# Patient Record
Sex: MALE | Race: BLACK OR AFRICAN AMERICAN | NOT HISPANIC OR LATINO | Employment: OTHER | URBAN - NONMETROPOLITAN AREA
[De-identification: names, ages, dates, MRNs, and addresses within clinical notes are randomized per-mention and may not be internally consistent; named-entity substitution may affect disease eponyms.]

---

## 2024-03-15 PROBLEM — E78.5 HYPERLIPIDEMIA: Status: ACTIVE | Noted: 2024-03-15

## 2024-03-15 PROBLEM — I65.29 CAROTID ATHEROSCLEROSIS: Status: ACTIVE | Noted: 2024-03-15

## 2024-03-15 PROBLEM — I25.10 ARTERIOSCLEROTIC CORONARY ARTERY DISEASE: Status: ACTIVE | Noted: 2024-03-15

## 2024-03-15 PROBLEM — G20.A1 PARKINSON'S DISEASE (MULTI): Status: ACTIVE | Noted: 2024-03-15

## 2024-03-15 PROBLEM — R94.31 ABNORMAL EKG: Status: ACTIVE | Noted: 2024-03-15

## 2024-03-15 PROBLEM — Z98.61 HISTORY OF PTCA: Status: ACTIVE | Noted: 2024-03-15

## 2024-03-15 PROBLEM — I10 ESSENTIAL HYPERTENSION: Status: ACTIVE | Noted: 2024-03-15

## 2024-03-15 RX ORDER — AMLODIPINE AND BENAZEPRIL HYDROCHLORIDE 10; 20 MG/1; MG/1
1 CAPSULE ORAL DAILY
COMMUNITY
Start: 2022-07-10

## 2024-03-15 RX ORDER — BISMUTH SUBSALICYLATE 262 MG
1 TABLET,CHEWABLE ORAL DAILY
COMMUNITY

## 2024-03-15 RX ORDER — GLUCOSAM/CHON-MSM1/C/MANG/BOSW 750-644 MG
2 TABLET ORAL DAILY
COMMUNITY

## 2024-03-15 RX ORDER — ASPIRIN 81 MG/1
1 TABLET ORAL DAILY
COMMUNITY
Start: 2022-08-12

## 2024-03-15 RX ORDER — ATORVASTATIN CALCIUM 80 MG/1
1 TABLET, FILM COATED ORAL NIGHTLY
COMMUNITY
Start: 2021-08-11

## 2024-03-15 RX ORDER — OMEPRAZOLE 20 MG/1
20 CAPSULE, DELAYED RELEASE ORAL
COMMUNITY
Start: 2022-07-23

## 2024-03-15 RX ORDER — B1/B2/B3/B5/B6/IRON/METH/CHOLN 2.5-18/15
LIQUID (ML) ORAL DAILY
COMMUNITY

## 2024-03-15 RX ORDER — CARVEDILOL 6.25 MG/1
6.25 TABLET ORAL
COMMUNITY

## 2024-03-15 RX ORDER — TAMSULOSIN HYDROCHLORIDE 0.4 MG/1
1 CAPSULE ORAL DAILY
COMMUNITY
Start: 2022-05-30

## 2024-08-16 ENCOUNTER — TELEPHONE (OUTPATIENT)
Dept: CARDIOLOGY | Facility: CLINIC | Age: 84
End: 2024-08-16
Payer: MEDICARE

## 2024-08-19 ENCOUNTER — APPOINTMENT (OUTPATIENT)
Dept: CARDIOLOGY | Facility: CLINIC | Age: 84
End: 2024-08-19
Payer: MEDICARE

## 2024-09-08 NOTE — PROGRESS NOTES
"Patient is new to this provider.  Last seen by Dr. Jeffery in August 2023.    Subjective :     Pleasant 84-year-old.  Independent in activities of daily living ambulates with the help of cane.  No falls interval review of systems is negative for chest discomfort pressure tightness heaviness palpitations lightheadedness orthopnea paroxysmal nocturnal dyspnea dependent edema or claudication TIA or CVA type symptoms or bleeding diathesis      Cardiovascular  history:  1.  Essential hypertension  2.  Mixed hyperlipidemia  3.  Former smoker, quit more than 20 years ago  4.  Atherosclerotic native vessel coronary artery disease remote PCI in [place].  5.  History of carotid endarterectomy  6.  Primary hypertension  7.  Carotid ultrasound August 2021-50 to 70% stenosis right internal carotid artery less than 50% stenosis left internal carotid artery antegrade vertebral flow  As of 9/9/2024 visit, I do not see any objective data pertaining to prior testing  Medical history so Far :( as outlined in Dr. Wilde's notes)    Calculus of gallbladder with acute on chronic cholecystitis without obstruction   Carotid artery stenosis   Cholangiectasis   Constipation   Coronary artery disease (CMS/HCC)   Gallstones   Gastroesophageal reflux disease   Hyperlipidemia (CMS/HCC)   Hypertension (CMS/HCC)   Osteoarthritis   Peptic ulcer disease   Right upper quadrant pain   Urinary hesitancy       Past Medical History:     Arthritis   GI bleed 2023   hospitalized   Hypertension (CMS/HCC)   Visual impairment   with correct lenses   Objective   Wt Readings from Last 3 Encounters:   09/09/24 91.2 kg (201 lb)   08/09/23 91.2 kg (201 lb)   08/10/22 95.3 kg (210 lb)            Vitals:    09/09/24 1019 09/09/24 1055   BP: 154/70 160/64   BP Location: Left arm Left arm   Patient Position: Sitting Sitting   Pulse: 62    Weight: 91.2 kg (201 lb)    Height: 1.778 m (5' 10\")                 Physical Exam:    GENERAL APPEARANCE: in no acute " distress.  CHEST: Symmetric and non-tender.  INTEGUMENT: Skin warm and dry  HEENT: No gross abnormalities identified.No pallor or scleral icterus.  NECK: Supple, no JVD, no bruit.   NEURO/PSHCY: Alert and oriented x3; appropriate behavior and responses and responses  LUNGS: Clear to auscultation bilaterally; normal respiratory effort.  HEART: Rate and rhythm regular with no evident murmur; no gallop appreciated.   ABDOMEN: Soft, non tender.  MUSCULOSKELETAL: No gross deformities.  EXTREMITIES: Warm  There is 1-2+ pitting edema    Meds:  Current Outpatient Medications   Medication Instructions    acetaminophen (TYLENOL 8 HOUR) 650 mg, oral, Every 8 hours PRN, Do not crush, chew, or split.    amLODIPine-benazepriL (Lotrel) 10-20 mg capsule 1 capsule, oral, Daily    aspirin 81 mg EC tablet 1 tablet, oral, Daily    atorvastatin (Lipitor) 80 mg tablet 1 tablet, oral, Nightly    A5-A9-V3-B5-B6-iron-met-choln (Geritol Tonic with Ferrex 18) 2.5 mg-50 mg-18 iron/15 mL liquid oral, Daily    CALCIUM POLYCARBOPHIL ORAL 1 tablet, oral, Daily    bznlompz-ivhu-yqx5-C-sam-bosw (Osteo Bi-Flex Triple Strength) 750 mg-644 mg- 30 mg-1 mg tablet 2 tablets, oral, Daily    multivitamin tablet 1 tablet, oral, Daily    omeprazole (PRILOSEC) 20 mg, oral, Daily before breakfast    tamsulosin (Flomax) 0.4 mg 24 hr capsule 1 capsule, oral, Daily          No Known Allergies          LABS:    Laboratory data January 2024-hemoglobin 9.3 hematocrit 27.9 MCV 89.7 platelet count 2 78,000 sodium 140 potassium 3.6 creatinine 1.2 GFR 50 liver enzymes normal    Hemoglobin and hematocrit from August 2020 2311 and 33 respectively.    Patient Active Problem List    Diagnosis Date Noted    Use of cane as ambulatory aid 09/09/2024    Anemia 09/09/2024    Former smoker 09/09/2024    Abnormal EKG 03/15/2024    Arteriosclerotic coronary artery disease 03/15/2024    Carotid atherosclerosis 03/15/2024    Essential hypertension 03/15/2024    Hyperlipidemia  03/15/2024    Parkinson's disease (Multi) 03/15/2024    History of PTCA 03/15/2024                 Assessment:    1. Mixed hyperlipidemia        2. History of PTCA        3. Essential hypertension        4. Arteriosclerotic coronary artery disease        5. Atherosclerosis of left carotid artery        6. Use of cane as ambulatory aid        7. Anemia, unspecified type        8. Former smoker        Blood pressure is above target  As far as CAD goes, no angina or anginal equivalent  Blood pressure elevated, not orthostatic, we will increase the carvedilol to 12.5 mg p.o. twice daily  Lower extremity edema could be partly related to amlodipine.  Will continue to observe  Anemia-on iron supplementation, patient denies any bleeding diathesis, being addressed by primary care  Lipid profile is at target      Follow up : 1 year    Echocardiogram and carotid ultrasound in the near future  Follow-up with me in 2 weeks.  Provider Attestation - Scribe documentation    All medical record entries made by the Scribe were at my direction and personally dictated by me. I have reviewed the chart and agree that the record accurately reflects my personal performance of the history, physical exam, discussion and plan.   Scribe Attestation  By signing my name below, I, Iwona BELL LPN  , [place]   attest that this documentation has been prepared under the direction and in the presence of Johanna Vaughan MD.

## 2024-09-09 ENCOUNTER — TELEPHONE (OUTPATIENT)
Dept: CARDIOLOGY | Facility: CLINIC | Age: 84
End: 2024-09-09

## 2024-09-09 ENCOUNTER — APPOINTMENT (OUTPATIENT)
Dept: CARDIOLOGY | Facility: CLINIC | Age: 84
End: 2024-09-09
Payer: MEDICARE

## 2024-09-09 VITALS
WEIGHT: 201 LBS | HEART RATE: 62 BPM | DIASTOLIC BLOOD PRESSURE: 64 MMHG | BODY MASS INDEX: 28.77 KG/M2 | SYSTOLIC BLOOD PRESSURE: 160 MMHG | HEIGHT: 70 IN

## 2024-09-09 DIAGNOSIS — Z99.89 USE OF CANE AS AMBULATORY AID: ICD-10-CM

## 2024-09-09 DIAGNOSIS — I65.22 ATHEROSCLEROSIS OF LEFT CAROTID ARTERY: ICD-10-CM

## 2024-09-09 DIAGNOSIS — I25.10 ARTERIOSCLEROTIC CORONARY ARTERY DISEASE: ICD-10-CM

## 2024-09-09 DIAGNOSIS — E78.2 MIXED HYPERLIPIDEMIA: ICD-10-CM

## 2024-09-09 DIAGNOSIS — D64.9 ANEMIA, UNSPECIFIED TYPE: ICD-10-CM

## 2024-09-09 DIAGNOSIS — I10 ESSENTIAL HYPERTENSION: ICD-10-CM

## 2024-09-09 DIAGNOSIS — Z13.6 ENCOUNTER FOR SCREENING FOR STENOSIS OF CAROTID ARTERY: ICD-10-CM

## 2024-09-09 DIAGNOSIS — Z87.891 FORMER SMOKER: ICD-10-CM

## 2024-09-09 DIAGNOSIS — Z98.61 HISTORY OF PTCA: ICD-10-CM

## 2024-09-09 PROCEDURE — 1159F MED LIST DOCD IN RCRD: CPT | Performed by: INTERNAL MEDICINE

## 2024-09-09 PROCEDURE — 3077F SYST BP >= 140 MM HG: CPT | Performed by: INTERNAL MEDICINE

## 2024-09-09 PROCEDURE — 3078F DIAST BP <80 MM HG: CPT | Performed by: INTERNAL MEDICINE

## 2024-09-09 PROCEDURE — 99214 OFFICE O/P EST MOD 30 MIN: CPT | Performed by: INTERNAL MEDICINE

## 2024-09-09 RX ORDER — AMLODIPINE AND BENAZEPRIL HYDROCHLORIDE 10; 20 MG/1; MG/1
1 CAPSULE ORAL DAILY
Qty: 90 CAPSULE | Refills: 3 | Status: SHIPPED | OUTPATIENT
Start: 2024-09-09 | End: 2025-09-09

## 2024-09-09 RX ORDER — DEXTROMETHORPHAN HYDROBROMIDE, GUAIFENESIN 5; 100 MG/5ML; MG/5ML
650 LIQUID ORAL EVERY 8 HOURS PRN
COMMUNITY

## 2024-09-09 RX ORDER — ATORVASTATIN CALCIUM 80 MG/1
80 TABLET, FILM COATED ORAL NIGHTLY
Qty: 90 TABLET | Refills: 3 | Status: SHIPPED | OUTPATIENT
Start: 2024-09-09 | End: 2025-09-09

## 2024-09-09 RX ORDER — CARVEDILOL 12.5 MG/1
12.5 TABLET ORAL
Qty: 60 TABLET | Refills: 11 | Status: SHIPPED | OUTPATIENT
Start: 2024-09-09 | End: 2025-09-09

## 2024-09-09 NOTE — LETTER
September 9, 2024     Hipolito Wilde DO  [address]  [facility] [address]    Patient: Wilfred Kearns   YOB: 1940   Date of Visit: 9/9/2024       Dear Dr. Hipolito Wilde DO:    Thank you for referring Wilfred Kearns to me for evaluation. Below are my notes for this consultation.  If you have questions, please do not hesitate to call me. I look forward to following your patient along with you.       Sincerely,     Johanna Vaughan MD      CC: No Recipients  ______________________________________________________________________________________    Patient is new to this provider.  Last seen by Dr. Jeffery in August 2023.    Subjective :     Pleasant 84-year-old.  Independent in activities of daily living ambulates with the help of cane.  No falls interval review of systems is negative for chest discomfort pressure tightness heaviness palpitations lightheadedness orthopnea paroxysmal nocturnal dyspnea dependent edema or claudication TIA or CVA type symptoms or bleeding diathesis      Cardiovascular  history:  1.  Essential hypertension  2.  Mixed hyperlipidemia  3.  Former smoker, quit more than 20 years ago  4.  Atherosclerotic native vessel coronary artery disease remote PCI in [place].  5.  History of carotid endarterectomy  6.  Primary hypertension  7.  Carotid ultrasound August 2021-50 to 70% stenosis right internal carotid artery less than 50% stenosis left internal carotid artery antegrade vertebral flow  As of 9/9/2024 visit, I do not see any objective data pertaining to prior testing  Medical history so Far :( as outlined in Dr. Wilde's notes)    Calculus of gallbladder with acute on chronic cholecystitis without obstruction   Carotid artery stenosis   Cholangiectasis   Constipation   Coronary artery disease (CMS/HCC)   Gallstones   Gastroesophageal reflux disease   Hyperlipidemia (CMS/HCC)   Hypertension (CMS/HCC)   Osteoarthritis   Peptic ulcer disease   Right upper quadrant pain  "  Urinary hesitancy       Past Medical History:     Arthritis   GI bleed 2023   hospitalized   Hypertension (CMS/[facility])   Visual impairment   with correct lenses   Objective   Wt Readings from Last 3 Encounters:   09/09/24 91.2 kg (201 lb)   08/09/23 91.2 kg (201 lb)   08/10/22 95.3 kg (210 lb)            Vitals:    09/09/24 1019 09/09/24 1055   BP: 154/70 160/64   BP Location: Left arm Left arm   Patient Position: Sitting Sitting   Pulse: 62    Weight: 91.2 kg (201 lb)    Height: 1.778 m (5' 10\")                 Physical Exam:    GENERAL APPEARANCE: in no acute distress.  CHEST: Symmetric and non-tender.  INTEGUMENT: Skin warm and dry  HEENT: No gross abnormalities identified.No pallor or scleral icterus.  NECK: Supple, no JVD, no bruit.   NEURO/PSHCY: Alert and oriented x3; appropriate behavior and responses and responses  LUNGS: Clear to auscultation bilaterally; normal respiratory effort.  HEART: Rate and rhythm regular with no evident murmur; no gallop appreciated.   ABDOMEN: Soft, non tender.  MUSCULOSKELETAL: No gross deformities.  EXTREMITIES: Warm  There is 1-2+ pitting edema    Meds:  Current Outpatient Medications   Medication Instructions   • acetaminophen (TYLENOL 8 HOUR) 650 mg, oral, Every 8 hours PRN, Do not crush, chew, or split.   • amLODIPine-benazepriL (Lotrel) 10-20 mg capsule 1 capsule, oral, Daily   • aspirin 81 mg EC tablet 1 tablet, oral, Daily   • atorvastatin (Lipitor) 80 mg tablet 1 tablet, oral, Nightly   • P4-A2-R2-B5-B6-iron-met-choln (Geritol Tonic with Ferrex 18) 2.5 mg-50 mg-18 iron/15 mL liquid oral, Daily   • CALCIUM POLYCARBOPHIL ORAL 1 tablet, oral, Daily   • qblhvsyr-ftut-ltz2-C-sam-bosw (Osteo Bi-Flex Triple Strength) 750 mg-644 mg- 30 mg-1 mg tablet 2 tablets, oral, Daily   • multivitamin tablet 1 tablet, oral, Daily   • omeprazole (PRILOSEC) 20 mg, oral, Daily before breakfast   • tamsulosin (Flomax) 0.4 mg 24 hr capsule 1 capsule, oral, Daily          No Known " Allergies          LABS:    Laboratory data January 2024-hemoglobin 9.3 hematocrit 27.9 MCV 89.7 platelet count 2 78,000 sodium 140 potassium 3.6 creatinine 1.2 GFR 50 liver enzymes normal    Hemoglobin and hematocrit from August 2020 2311 and 33 respectively.    Patient Active Problem List    Diagnosis Date Noted   • Use of cane as ambulatory aid 09/09/2024   • Anemia 09/09/2024   • Former smoker 09/09/2024   • Abnormal EKG 03/15/2024   • Arteriosclerotic coronary artery disease 03/15/2024   • Carotid atherosclerosis 03/15/2024   • Essential hypertension 03/15/2024   • Hyperlipidemia 03/15/2024   • Parkinson's disease (Multi) 03/15/2024   • History of PTCA 03/15/2024                 Assessment:    1. Mixed hyperlipidemia        2. History of PTCA        3. Essential hypertension        4. Arteriosclerotic coronary artery disease        5. Atherosclerosis of left carotid artery        6. Use of cane as ambulatory aid        7. Anemia, unspecified type        8. Former smoker        Blood pressure is above target  As far as CAD goes, no angina or anginal equivalent  Blood pressure elevated, not orthostatic, we will increase the carvedilol to 12.5 mg p.o. twice daily  Lower extremity edema could be partly related to amlodipine.  Will continue to observe  Anemia-on iron supplementation, patient denies any bleeding diathesis, being addressed by primary care  Lipid profile is at target      Follow up : 1 year    Echocardiogram and carotid ultrasound in the near future  Follow-up with me in 2 weeks.  Provider Attestation - Scribe documentation    All medical record entries made by the Scribe were at my direction and personally dictated by me. I have reviewed the chart and agree that the record accurately reflects my personal performance of the history, physical exam, discussion and plan.   Scribe Attestation  By signing my name below, I, Iwona BELL LPN  , Scribe   attest that this documentation has been prepared under the  direction and in the presence of Johanna Vaughan MD.

## 2024-09-09 NOTE — PATIENT INSTRUCTIONS
Please bring all medicines, vitamins, and herbal supplements with you when you come to the office.    Prescriptions will not be filled unless you are compliant with your follow up appointments or have a follow up appointment scheduled as per instruction of your physician. Refills should be requested at the time of your visit.   BMI was above normal measurement. Current weight: 91.2 kg (201 lb)  Weight change since last visit (-) denotes wt loss 0 lbs   Weight loss needed to achieve BMI 25: 27.1 Lbs  Weight loss needed to achieve BMI 30: -7.6 Lbs  Advised to Increase physical activity.

## 2024-09-27 ENCOUNTER — APPOINTMENT (OUTPATIENT)
Dept: CARDIOLOGY | Facility: CLINIC | Age: 84
End: 2024-09-27
Payer: MEDICARE

## 2024-10-07 ENCOUNTER — APPOINTMENT (OUTPATIENT)
Dept: CARDIOLOGY | Facility: CLINIC | Age: 84
End: 2024-10-07
Payer: MEDICARE

## 2024-10-21 ENCOUNTER — APPOINTMENT (OUTPATIENT)
Dept: CARDIOLOGY | Facility: CLINIC | Age: 84
End: 2024-10-21
Payer: MEDICARE

## 2024-10-26 ENCOUNTER — HOSPITAL ENCOUNTER (OUTPATIENT)
Dept: CARDIOLOGY | Facility: CLINIC | Age: 84
Discharge: HOME | End: 2024-10-26
Payer: MEDICARE

## 2024-10-26 DIAGNOSIS — Z13.6 ENCOUNTER FOR SCREENING FOR STENOSIS OF CAROTID ARTERY: ICD-10-CM

## 2024-10-26 DIAGNOSIS — I65.23 OCCLUSION AND STENOSIS OF BILATERAL CAROTID ARTERIES: ICD-10-CM

## 2024-10-26 DIAGNOSIS — I25.10 ARTERIOSCLEROTIC CORONARY ARTERY DISEASE: ICD-10-CM

## 2024-10-26 DIAGNOSIS — I65.22 ATHEROSCLEROSIS OF LEFT CAROTID ARTERY: ICD-10-CM

## 2024-10-26 LAB
AORTIC VALVE MEAN GRADIENT: 6 MMHG
AORTIC VALVE PEAK VELOCITY: 1.7 M/S
AV PEAK GRADIENT: 11.6 MMHG
AVA (PEAK VEL): 2.41 CM2
AVA (VTI): 2.47 CM2
EJECTION FRACTION APICAL 4 CHAMBER: 54.5
EJECTION FRACTION: 58 %
LEFT VENTRICLE INTERNAL DIMENSION DIASTOLE: 5.18 CM (ref 3.5–6)
LEFT VENTRICULAR OUTFLOW TRACT DIAMETER: 2.5 CM
LV EJECTION FRACTION BIPLANE: 55 %
MITRAL VALVE E/A RATIO: 0.77
MITRAL VALVE E/E' RATIO: 12.2
RIGHT VENTRICLE PEAK SYSTOLIC PRESSURE: 45.8 MMHG

## 2024-10-26 PROCEDURE — 93880 EXTRACRANIAL BILAT STUDY: CPT | Performed by: INTERNAL MEDICINE

## 2024-10-26 PROCEDURE — 93306 TTE W/DOPPLER COMPLETE: CPT

## 2024-10-26 PROCEDURE — 93880 EXTRACRANIAL BILAT STUDY: CPT

## 2024-10-26 PROCEDURE — 93306 TTE W/DOPPLER COMPLETE: CPT | Performed by: INTERNAL MEDICINE

## 2024-12-02 ENCOUNTER — APPOINTMENT (OUTPATIENT)
Dept: CARDIOLOGY | Facility: CLINIC | Age: 84
End: 2024-12-02
Payer: MEDICARE

## 2024-12-02 VITALS
HEIGHT: 70 IN | HEART RATE: 66 BPM | SYSTOLIC BLOOD PRESSURE: 164 MMHG | WEIGHT: 197 LBS | BODY MASS INDEX: 28.2 KG/M2 | DIASTOLIC BLOOD PRESSURE: 76 MMHG

## 2024-12-02 DIAGNOSIS — Z71.2 ENCOUNTER TO DISCUSS TEST RESULTS: ICD-10-CM

## 2024-12-02 DIAGNOSIS — I10 ESSENTIAL HYPERTENSION: ICD-10-CM

## 2024-12-02 DIAGNOSIS — I27.20 PULMONARY HTN (MULTI): ICD-10-CM

## 2024-12-02 DIAGNOSIS — Z98.61 HISTORY OF PTCA: ICD-10-CM

## 2024-12-02 DIAGNOSIS — D64.9 ANEMIA, UNSPECIFIED TYPE: ICD-10-CM

## 2024-12-02 DIAGNOSIS — Z99.89 USE OF CANE AS AMBULATORY AID: ICD-10-CM

## 2024-12-02 DIAGNOSIS — Z87.891 FORMER SMOKER: ICD-10-CM

## 2024-12-02 DIAGNOSIS — E78.2 MIXED HYPERLIPIDEMIA: ICD-10-CM

## 2024-12-02 DIAGNOSIS — I65.22 ATHEROSCLEROSIS OF LEFT CAROTID ARTERY: ICD-10-CM

## 2024-12-02 DIAGNOSIS — Z79.899 MEDICATION COURSE CHANGED: ICD-10-CM

## 2024-12-02 PROCEDURE — 3078F DIAST BP <80 MM HG: CPT | Performed by: INTERNAL MEDICINE

## 2024-12-02 PROCEDURE — 1160F RVW MEDS BY RX/DR IN RCRD: CPT | Performed by: INTERNAL MEDICINE

## 2024-12-02 PROCEDURE — G2211 COMPLEX E/M VISIT ADD ON: HCPCS | Performed by: INTERNAL MEDICINE

## 2024-12-02 PROCEDURE — 99214 OFFICE O/P EST MOD 30 MIN: CPT | Performed by: INTERNAL MEDICINE

## 2024-12-02 PROCEDURE — 1159F MED LIST DOCD IN RCRD: CPT | Performed by: INTERNAL MEDICINE

## 2024-12-02 PROCEDURE — 1036F TOBACCO NON-USER: CPT | Performed by: INTERNAL MEDICINE

## 2024-12-02 PROCEDURE — 3077F SYST BP >= 140 MM HG: CPT | Performed by: INTERNAL MEDICINE

## 2024-12-02 RX ORDER — AMLODIPINE AND BENAZEPRIL HYDROCHLORIDE 10; 20 MG/1; MG/1
1 CAPSULE ORAL DAILY
Qty: 90 CAPSULE | Refills: 2 | Status: SHIPPED | OUTPATIENT
Start: 2024-12-02 | End: 2025-12-02

## 2024-12-02 RX ORDER — CARVEDILOL 25 MG/1
25 TABLET ORAL
Qty: 180 TABLET | Refills: 2 | Status: SHIPPED | OUTPATIENT
Start: 2024-12-02 | End: 2025-12-02

## 2024-12-02 RX ORDER — ATORVASTATIN CALCIUM 80 MG/1
80 TABLET, FILM COATED ORAL NIGHTLY
Qty: 90 TABLET | Refills: 2 | Status: SHIPPED | OUTPATIENT
Start: 2024-12-02 | End: 2025-12-02

## 2024-12-02 NOTE — PATIENT INSTRUCTIONS
Please bring all medicines, vitamins, and herbal supplements with you when you come to the office.    Prescriptions will not be filled unless you are compliant with your follow up appointments or have a follow up appointment scheduled as per instruction of your physician. Refills should be requested at the time of your visit.     BMI was above normal measurement. Current weight: 89.4 kg (197 lb)  Weight change since last visit (-) denotes wt loss -4 lbs   Weight loss needed to achieve BMI 25: 23.1 Lbs  Weight loss needed to achieve BMI 30: -11.6 Lbs  Provided instructions on dietary changes  Provided instructions on exercise.

## 2024-12-02 NOTE — PROGRESS NOTES
Patient was most recently seen September 2024.  Patient underwent echocardiogram and carotid ultrasound.  Carvedilol dose was increased to 12.5 mg p.o. twice daily.    Subjective :     Accompanied by daughter Yina to the office.  Blood pressure is elevated in office today.  Yina believes it is because patient was upset.  Will recheck blood pressure after close to half an hour, and it was still elevated, not orthostatic  Interval review of systems is negative for chest discomfort pressure tightness heaviness palpitations lightheadedness orthopnea paroxysmal nocturnal dyspnea dependent edema or claudication TIA or CVA type symptoms or bleeding diathesis      History so Far :  1.  Essential hypertension  2.  Mixed hyperlipidemia  3.  Former smoker, quit more than 20 years ago  4.  Atherosclerotic native vessel coronary artery disease remote PCI in [place].  5.  History of carotid endarterectomy  6.  Primary hypertension  7.  Carotid ultrasound August 2021-50 to 70% stenosis right internal carotid artery less than 50% stenosis left internal carotid artery antegrade vertebral flow  As of 9/9/2024 visit, I do not see any objective data pertaining to prior testing  8.  History of bilateral carotid endarterectomy  9.  Carotid ultrasound October 2024-bilateral antegrade vertebral flow less than 50% stenosis left proximal internal carotid artery patent left carotid endarterectomy site irregular plaque right internal carotid artery, no evidence of hemodynamically significant stenosis in the right common carotid artery elevated velocities right external carotid artery degree of narrowing was not commented on, peak velocity on the right 175 cm/s peak velocity on the left 148 cm/s  10.  Echocardiogram October 2024-LVEF 55 to 60% impaired relaxation pattern of LV diastolic filling left atrial diameter 4.4 cm mild concentric left ventricular hypertrophy normal RV systolic function mild mitral regurgitation moderately dilated  "inferior vena cava RV systolic pressure 46 mmHg, degree of tricuspid regurgitation was not adequately detected.       Objective   Wt Readings from Last 3 Encounters:   12/02/24 89.4 kg (197 lb)   09/09/24 91.2 kg (201 lb)   08/09/23 91.2 kg (201 lb)            Vitals:    12/02/24 1433 12/02/24 1501 12/02/24 1502   BP: 158/68 160/76 164/76   BP Location: Left arm Right arm Right arm   Patient Position: Sitting Sitting Standing   Pulse: 66     Weight: 89.4 kg (197 lb)     Height: 1.778 m (5' 10\")                  Physical Exam:    GENERAL APPEARANCE: in no acute distress.  CHEST: Symmetric and non-tender.  INTEGUMENT: Skin warm and dry  HEENT: No gross abnormalities identified.No pallor or scleral icterus.  NECK: Supple, bilateral carotid endarterectomy scar, right carotid bruit  NEURO/PSHCY: Alert and oriented x3; appropriate behavior and responses and responses  LUNGS: Clear to auscultation bilaterally; normal respiratory effort.  HEART: Rate and rhythm regular with no evident murmur; no gallop appreciated.   ABDOMEN: Soft, non tender.  MUSCULOSKELETAL: No gross deformities.  EXTREMITIES: Warm  There is no edema noted.    Meds:  Current Outpatient Medications   Medication Instructions    acetaminophen (TYLENOL 8 HOUR) 650 mg, Every 8 hours PRN    amLODIPine-benazepriL (Lotrel) 10-20 mg capsule 1 capsule, oral, Daily    atorvastatin (LIPITOR) 80 mg, oral, Nightly    B1-W1-M3-B5-B6-iron-met-choln (Geritol Tonic with Ferrex 18) 2.5 mg-50 mg-18 iron/15 mL liquid Daily    CALCIUM POLYCARBOPHIL ORAL 1 tablet, Daily    multivitamin tablet 1 tablet, Daily    omeprazole (PRILOSEC) 20 mg, Daily before breakfast    tamsulosin (Flomax) 0.4 mg 24 hr capsule 1 capsule, Daily          No Known Allergies    LABS:    No new labs since last visit    Patient Active Problem List    Diagnosis Date Noted    Pulmonary HTN (Multi) 12/02/2024    Body mass index (BMI) 28.0-28.9, adult 12/02/2024    Medication course changed 12/02/2024    Use " of cane as ambulatory aid 09/09/2024    Anemia 09/09/2024    Former smoker 09/09/2024    Abnormal EKG 03/15/2024    Arteriosclerotic coronary artery disease 03/15/2024    Carotid atherosclerosis 03/15/2024    Essential hypertension 03/15/2024    Hyperlipidemia 03/15/2024    Parkinson's disease (Multi) 03/15/2024    History of PTCA 03/15/2024                 Assessment:    1. Essential hypertension  Follow Up In Cardiology      2. Mixed hyperlipidemia        3. Pulmonary HTN (Multi)        4. Anemia, unspecified type        5. Atherosclerosis of left carotid artery        6. History of PTCA        7. Use of cane as ambulatory aid        8. Former smoker        9. Body mass index (BMI) 28.0-28.9, adult        10. Medication course changed           Clinical decision making:  Continue aggressive ongoing risk factor modification and aspirin therapy  Increase carvedilol to 25 mg p.o. twice daily.  Daughter would like to wait, and follow his blood pressures at home, and it is still elevated would like to make the change, and that is reasonable.  She believes that his blood pressure is elevated today because he was upset.  Follow up : 9/2025        Provider Attestation - Lisa LEIVA LPN Scribe documentation    All medical record entries made by the Scribe were at my direction and personally dictated by me. I have reviewed the chart and agree that the record accurately reflects my personal performance of the history, physical exam, discussion and plan.

## 2024-12-02 NOTE — LETTER
December 2, 2024     Hipolito Wilde DO  [address]  [place] [address]    Patient: Wilfred Kearns   YOB: 1940   Date of Visit: 12/2/2024       Dear Dr. Hipolito Wilde DO:    Thank you for referring Wilfred Kearns to me for evaluation. Below are my notes for this consultation.  If you have questions, please do not hesitate to call me. I look forward to following your patient along with you.       Sincerely,     Johanna Vaughan MD      CC: No Recipients  ______________________________________________________________________________________    Patient was most recently seen September 2024.  Patient underwent echocardiogram and carotid ultrasound.  Carvedilol dose was increased to 12.5 mg p.o. twice daily.    Subjective :     Accompanied by daughter Yina to the office.  Blood pressure is elevated in office today.  Yina believes it is because patient was upset.  Will recheck blood pressure after close to half an hour, and it was still elevated, not orthostatic  Interval review of systems is negative for chest discomfort pressure tightness heaviness palpitations lightheadedness orthopnea paroxysmal nocturnal dyspnea dependent edema or claudication TIA or CVA type symptoms or bleeding diathesis      History so Far :  1.  Essential hypertension  2.  Mixed hyperlipidemia  3.  Former smoker, quit more than 20 years ago  4.  Atherosclerotic native vessel coronary artery disease remote PCI in [place].  5.  History of carotid endarterectomy  6.  Primary hypertension  7.  Carotid ultrasound August 2021-50 to 70% stenosis right internal carotid artery less than 50% stenosis left internal carotid artery antegrade vertebral flow  As of 9/9/2024 visit, I do not see any objective data pertaining to prior testing  8.  History of bilateral carotid endarterectomy  9.  Carotid ultrasound October 2024-bilateral antegrade vertebral flow less than 50% stenosis left proximal internal carotid artery patent  "left carotid endarterectomy site irregular plaque right internal carotid artery, no evidence of hemodynamically significant stenosis in the right common carotid artery elevated velocities right external carotid artery degree of narrowing was not commented on, peak velocity on the right 175 cm/s peak velocity on the left 148 cm/s  10.  Echocardiogram October 2024-LVEF 55 to 60% impaired relaxation pattern of LV diastolic filling left atrial diameter 4.4 cm mild concentric left ventricular hypertrophy normal RV systolic function mild mitral regurgitation moderately dilated inferior vena cava RV systolic pressure 46 mmHg, degree of tricuspid regurgitation was not adequately detected.       Objective   Wt Readings from Last 3 Encounters:   12/02/24 89.4 kg (197 lb)   09/09/24 91.2 kg (201 lb)   08/09/23 91.2 kg (201 lb)            Vitals:    12/02/24 1433 12/02/24 1501 12/02/24 1502   BP: 158/68 160/76 164/76   BP Location: Left arm Right arm Right arm   Patient Position: Sitting Sitting Standing   Pulse: 66     Weight: 89.4 kg (197 lb)     Height: 1.778 m (5' 10\")                  Physical Exam:    GENERAL APPEARANCE: in no acute distress.  CHEST: Symmetric and non-tender.  INTEGUMENT: Skin warm and dry  HEENT: No gross abnormalities identified.No pallor or scleral icterus.  NECK: Supple, bilateral carotid endarterectomy scar, right carotid bruit  NEURO/PSHCY: Alert and oriented x3; appropriate behavior and responses and responses  LUNGS: Clear to auscultation bilaterally; normal respiratory effort.  HEART: Rate and rhythm regular with no evident murmur; no gallop appreciated.   ABDOMEN: Soft, non tender.  MUSCULOSKELETAL: No gross deformities.  EXTREMITIES: Warm  There is no edema noted.    Meds:  Current Outpatient Medications   Medication Instructions   • acetaminophen (TYLENOL 8 HOUR) 650 mg, Every 8 hours PRN   • amLODIPine-benazepriL (Lotrel) 10-20 mg capsule 1 capsule, oral, Daily   • atorvastatin (LIPITOR) 80 " mg, oral, Nightly   • V1-A2-L2-B5-B6-iron-met-choln (Geritol Tonic with Ferrex 18) 2.5 mg-50 mg-18 iron/15 mL liquid Daily   • CALCIUM POLYCARBOPHIL ORAL 1 tablet, Daily   • multivitamin tablet 1 tablet, Daily   • omeprazole (PRILOSEC) 20 mg, Daily before breakfast   • tamsulosin (Flomax) 0.4 mg 24 hr capsule 1 capsule, Daily          No Known Allergies    LABS:    No new labs since last visit    Patient Active Problem List    Diagnosis Date Noted   • Pulmonary HTN (Multi) 12/02/2024   • Body mass index (BMI) 28.0-28.9, adult 12/02/2024   • Medication course changed 12/02/2024   • Use of cane as ambulatory aid 09/09/2024   • Anemia 09/09/2024   • Former smoker 09/09/2024   • Abnormal EKG 03/15/2024   • Arteriosclerotic coronary artery disease 03/15/2024   • Carotid atherosclerosis 03/15/2024   • Essential hypertension 03/15/2024   • Hyperlipidemia 03/15/2024   • Parkinson's disease (Multi) 03/15/2024   • History of PTCA 03/15/2024                 Assessment:    1. Essential hypertension  Follow Up In Cardiology      2. Mixed hyperlipidemia        3. Pulmonary HTN (Multi)        4. Anemia, unspecified type        5. Atherosclerosis of left carotid artery        6. History of PTCA        7. Use of cane as ambulatory aid        8. Former smoker        9. Body mass index (BMI) 28.0-28.9, adult        10. Medication course changed           Clinical decision making:  Continue aggressive ongoing risk factor modification and aspirin therapy  Increase carvedilol to 25 mg p.o. twice daily.  Daughter would like to wait, and follow his blood pressures at home, and it is still elevated would like to make the change, and that is reasonable.  She believes that his blood pressure is elevated today because he was upset.  Follow up : 9/2025        Provider Attestation - Lisa LEIAV LPN Scribe documentation    All medical record entries made by the Scribe were at my direction and personally dictated by me. I have reviewed the chart and  agree that the record accurately reflects my personal performance of the history, physical exam, discussion and plan.

## 2024-12-19 ENCOUNTER — TELEPHONE (OUTPATIENT)
Dept: CARDIOLOGY | Facility: CLINIC | Age: 84
End: 2024-12-19
Payer: MEDICARE

## 2024-12-19 NOTE — TELEPHONE ENCOUNTER
----- Message from Johanna Vaughan sent at 12/18/2024  5:41 PM EST -----  Blood pressure still elevated.  Please increase carvedilol from 12.5 mg p.o. twice daily to 25 mg p.o. twice daily and please continue to monitor blood pressure once or twice a day thank you  ----- Message -----  From: Justine Gotti  Sent: 12/9/2024   9:30 AM EST  To: Johanna Vaughan MD

## 2025-06-23 ENCOUNTER — APPOINTMENT (OUTPATIENT)
Dept: CARDIOLOGY | Facility: CLINIC | Age: 85
End: 2025-06-23
Payer: MEDICARE

## 2025-06-23 VITALS
WEIGHT: 202.4 LBS | HEIGHT: 70 IN | BODY MASS INDEX: 28.98 KG/M2 | HEART RATE: 54 BPM | DIASTOLIC BLOOD PRESSURE: 72 MMHG | SYSTOLIC BLOOD PRESSURE: 180 MMHG

## 2025-06-23 DIAGNOSIS — I10 ESSENTIAL HYPERTENSION: ICD-10-CM

## 2025-06-23 DIAGNOSIS — G20.A1 PARKINSON'S DISEASE, UNSPECIFIED WHETHER DYSKINESIA PRESENT, UNSPECIFIED WHETHER MANIFESTATIONS FLUCTUATE: ICD-10-CM

## 2025-06-23 DIAGNOSIS — Z01.810 ENCOUNTER FOR PRE-OPERATIVE CARDIOVASCULAR CLEARANCE: ICD-10-CM

## 2025-06-23 DIAGNOSIS — E78.2 MIXED HYPERLIPIDEMIA: ICD-10-CM

## 2025-06-23 DIAGNOSIS — I27.20 PULMONARY HYPERTENSION, UNSPECIFIED (MULTI): ICD-10-CM

## 2025-06-23 DIAGNOSIS — I27.20 PULMONARY HTN (MULTI): ICD-10-CM

## 2025-06-23 DIAGNOSIS — I10 UNCONTROLLED HYPERTENSION: ICD-10-CM

## 2025-06-23 DIAGNOSIS — Z87.891 FORMER SMOKER: ICD-10-CM

## 2025-06-23 DIAGNOSIS — Z99.89 USE OF CANE AS AMBULATORY AID: ICD-10-CM

## 2025-06-23 DIAGNOSIS — I25.10 CORONARY ARTERY DISEASE INVOLVING NATIVE CORONARY ARTERY OF NATIVE HEART WITHOUT ANGINA PECTORIS: ICD-10-CM

## 2025-06-23 DIAGNOSIS — Z98.61 HISTORY OF PTCA: ICD-10-CM

## 2025-06-23 DIAGNOSIS — I25.10 ARTERIOSCLEROTIC CORONARY ARTERY DISEASE: ICD-10-CM

## 2025-06-23 DIAGNOSIS — R94.31 ABNORMAL ELECTROCARDIOGRAM (ECG) (EKG): ICD-10-CM

## 2025-06-23 DIAGNOSIS — Z79.899 MEDICATION COURSE CHANGED: ICD-10-CM

## 2025-06-23 PROCEDURE — G2211 COMPLEX E/M VISIT ADD ON: HCPCS | Performed by: INTERNAL MEDICINE

## 2025-06-23 PROCEDURE — 1159F MED LIST DOCD IN RCRD: CPT | Performed by: INTERNAL MEDICINE

## 2025-06-23 PROCEDURE — 99214 OFFICE O/P EST MOD 30 MIN: CPT | Performed by: INTERNAL MEDICINE

## 2025-06-23 PROCEDURE — 93000 ELECTROCARDIOGRAM COMPLETE: CPT | Performed by: INTERNAL MEDICINE

## 2025-06-23 PROCEDURE — 1036F TOBACCO NON-USER: CPT | Performed by: INTERNAL MEDICINE

## 2025-06-23 PROCEDURE — 3077F SYST BP >= 140 MM HG: CPT | Performed by: INTERNAL MEDICINE

## 2025-06-23 PROCEDURE — 3078F DIAST BP <80 MM HG: CPT | Performed by: INTERNAL MEDICINE

## 2025-06-23 RX ORDER — AMLODIPINE AND BENAZEPRIL HYDROCHLORIDE 10; 20 MG/1; MG/1
1 CAPSULE ORAL DAILY
Qty: 90 CAPSULE | Refills: 3 | Status: SHIPPED | OUTPATIENT
Start: 2025-06-23 | End: 2026-06-23

## 2025-06-23 RX ORDER — ATORVASTATIN CALCIUM 80 MG/1
80 TABLET, FILM COATED ORAL NIGHTLY
Qty: 90 TABLET | Refills: 3 | Status: SHIPPED | OUTPATIENT
Start: 2025-06-23 | End: 2026-06-23

## 2025-06-23 RX ORDER — TRIAMTERENE AND HYDROCHLOROTHIAZIDE 37.5; 25 MG/1; MG/1
1 TABLET ORAL DAILY
Qty: 90 TABLET | Refills: 3 | Status: SHIPPED | OUTPATIENT
Start: 2025-06-23 | End: 2026-06-23

## 2025-06-23 RX ORDER — PREGABALIN 75 MG/1
75 CAPSULE ORAL 2 TIMES DAILY
COMMUNITY

## 2025-06-23 RX ORDER — CARVEDILOL 25 MG/1
25 TABLET ORAL
Qty: 180 TABLET | Refills: 3 | Status: SHIPPED | OUTPATIENT
Start: 2025-06-23 | End: 2026-06-23

## 2025-06-23 NOTE — LETTER
June 23, 2025     Hipolito Wilde DO  [address]  [place] [address]    Patient: Wilfred Kearns   YOB: 1940   Date of Visit: 6/23/2025       Dear Dr. Hipolito Wilde DO:    Thank you for referring Wilfred Kearns to me for evaluation. Below are my notes for this consultation.  If you have questions, please do not hesitate to call me. I look forward to following your patient along with you.       Sincerely,     Johanna Vaughan MD      CC: No Recipients  ______________________________________________________________________________________        Chief Complaint:    Chief Complaint   Patient presents with   • Pre-op Clearance   • Follow-up     Left knee - bone creeek   Patient is accompanied by daughter who identifies herself as Yina.  Patient is hard of hearing.  Is a pleasant 84-year-old gentleman with multiple cardiac risk factors to include hypertension and mixed hyperlipidemia.  He is here for preoperative cardiac risk assessment for left knee surgery.  Worsening left knee pain and difficulty with ambulation, uses cane as ambulatory aid.    Subjective : Falls asleep easily    Review of Systems has not had any falls    History so Far :    1.  Primary hypertension  2.  Mixed hyperlipidemia  3.  Hard of hearing  4.  Abnormal EKG with sinus bradycardia and first-degree AV block June 2025  5.  Echocardiogram October 2024-LVEF 55 to 60% normal chamber dimensions normal RV systolic function mild concentric left ventricular hypertrophy mild mitral regurgitation moderately dilated IVC left atrium 4.4 cm in diameter RV systolic pressure 46 mmHg.  Extent of tricuspid regurgitation was not reported.  6.  Carotid ultrasound October 2024-less than 50% left internal carotid artery stenosis no significant stenosis of the right internal carotid artery system bilateral antegrade vertebral flow patent left carotid endarterectomy site  7.  History of left carotid endarterectomy  8.  Gallstones without  "cholecystitis, status postcholecystectomy  9.  GI bleed 2023  10.  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  Right common carotid artery stent left carotid endarterectomy      Objective   Wt Readings from Last 3 Encounters:   06/23/25 91.8 kg (202 lb 6.4 oz)   12/02/24 89.4 kg (197 lb)   09/09/24 91.2 kg (201 lb)        Vitals:    06/23/25 1028 06/23/25 1113 06/23/25 1114   BP: 176/60 162/74 180/72   BP Location: Left arm Left arm Left arm   Patient Position: Sitting Sitting Standing   Pulse: 54     Weight: 91.8 kg (202 lb 6.4 oz)     Height: 1.778 m (5' 10\")        Social history and family history reviewed.  Family history noncontributory given patient's age     Physical Exam:    GENERAL APPEARANCE: in no acute distress.  CHEST: Symmetric and non-tender.  INTEGUMENT: Skin warm and dry  HEENT: No gross abnormalities identified.No pallor or scleral icterus.  NECK: Supple, no JVD, no bruit.   NEURO/PSHCY: Alert and oriented x3; appropriate behavior and responses and responses  LUNGS: Clear to auscultation bilaterally; normal respiratory effort.  HEART: Rate and rhythm regular with no evident murmur; no gallop appreciated.   ABDOMEN: Soft, non tender.  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    Meds:  Current Outpatient Medications   Medication Instructions   • acetaminophen (TYLENOL 8 HOUR) 650 mg, Every 8 hours PRN   • amLODIPine-benazepriL (Lotrel) 10-20 mg capsule 1 capsule, oral, Daily   • atorvastatin (LIPITOR) 80 mg, oral, Nightly   • R1-N5-M5-B5-B6-iron-met-choln (Geritol Tonic with Ferrex 18) 2.5 mg-50 mg-18 iron/15 mL liquid Daily   • CALCIUM POLYCARBOPHIL ORAL 1 tablet, Daily   • carvedilol (COREG) 25 mg, oral, 2 times daily (morning and late afternoon)   • multivitamin tablet 1 tablet, Daily   • " omeprazole (PRILOSEC) 20 mg, Daily before breakfast   • pregabalin (LYRICA) 75 mg, 2 times daily   • tamsulosin (Flomax) 0.4 mg 24 hr capsule 1 capsule, Daily   • triamterene-hydrochlorothiazid (Maxzide-25mg) 37.5-25 mg tablet 1 tablet, oral, Daily        Allergies:  Patient has no known allergies.    April 2024-total cholesterol 162 HDL 43 triglycerides 200 LDL 89 total cholesterol to HDL ratio 3.8  Reviewed all available pertinent laboratory data and diagnostic testing results that occurred after the last office visit with me                Assessment:    1. Encounter for pre-operative cardiovascular clearance  ECG 12 Lead    Nuclear Stress Test      2. Coronary artery disease involving native coronary artery of native heart without angina pectoris        3. History of PTCA        4. Pulmonary hypertension, unspecified (Multi)        5. Mixed hyperlipidemia  Lipid Panel    atorvastatin (Lipitor) 80 mg tablet    Lipid Panel      6. Essential hypertension        7. Use of cane as ambulatory aid        8. Abnormal electrocardiogram (ECG) (EKG)  Nuclear Stress Test      9. Uncontrolled hypertension  Nuclear Stress Test    Comprehensive Metabolic Panel    triamterene-hydrochlorothiazid (Maxzide-25mg) 37.5-25 mg tablet    amLODIPine-benazepriL (Lotrel) 10-20 mg capsule    carvedilol (Coreg) 25 mg tablet    Comprehensive Metabolic Panel      10. Pulmonary HTN (Multi)        11. Medication course changed  Comprehensive Metabolic Panel    Comprehensive Metabolic Panel      12. BMI 29.0-29.9,adult        13. Parkinson's disease, unspecified whether dyskinesia present, unspecified whether manifestations fluctuate        14. Former smoker        15. Arteriosclerotic coronary artery disease  Follow Up In Cardiology    Nuclear Stress Test           Clinical Decision Making:    Multiple comorbidities including hypertension that is suboptimally controlled, not orthostatic, atherosclerotic native vessel coronary artery disease per  chart review, prior PCI, bilateral carotid disease, prior to proceeding with knee surgery, we will proceed with further testing given that his activity level is less than 4 METS.  Also has sinus bradycardia with first-degree AV block.    We will add Maxide 25 to his current regimen for blood pressure management.  Basic metabolic profile 7 to 10 days after initiation of Maxide, along with comprehensive profile and lipid profile if not already done by Dr. Kilgore.  Proceed with echocardiogram Lexiscan Myoview carotid ultrasound.    His blood pressure medications will be titrated to upright blood pressure.    Follow up : after testing                IIwona LPN am scribing for, and in the presence of Dr. Johanna Vaughan MD, FACC.       I, Dr. Johanna Vaughan MD, FACC, personally performed the services described in the documentation as scribed by Iwona BELL LPN  in my presence, and confirm it is both accurate and complete.

## 2025-06-23 NOTE — PATIENT INSTRUCTIONS
Please bring all medicines, vitamins, and herbal supplements with you when you come to the office.    Prescriptions will not be filled unless you are compliant with your follow up appointments or have a follow up appointment scheduled as per instruction of your physician. Refills should be requested at the time of your visit.   BMI was above normal measurement. Current weight: 91.8 kg (202 lb 6.4 oz)  Weight change since last visit (-) denotes wt loss 5.4 lbs   Weight loss needed to achieve BMI 25: 28.5 Lbs  Weight loss needed to achieve BMI 30: -6.2 Lbs  Provided instructions on dietary changes.

## 2025-06-23 NOTE — PROGRESS NOTES
Chief Complaint:    Chief Complaint   Patient presents with    Pre-op Clearance    Follow-up     Left knee - bone creeek   Patient is accompanied by daughter who identifies herself as Yina.  Patient is hard of hearing.  Is a pleasant 84-year-old gentleman with multiple cardiac risk factors to include hypertension and mixed hyperlipidemia.  He is here for preoperative cardiac risk assessment for left knee surgery.  Worsening left knee pain and difficulty with ambulation, uses cane as ambulatory aid.    Subjective : Falls asleep easily    Review of Systems has not had any falls    History so Far :    1.  Primary hypertension  2.  Mixed hyperlipidemia  3.  Hard of hearing  4.  Abnormal EKG with sinus bradycardia and first-degree AV block June 2025  5.  Echocardiogram October 2024-LVEF 55 to 60% normal chamber dimensions normal RV systolic function mild concentric left ventricular hypertrophy mild mitral regurgitation moderately dilated IVC left atrium 4.4 cm in diameter RV systolic pressure 46 mmHg.  Extent of tricuspid regurgitation was not reported.  6.  Carotid ultrasound October 2024-less than 50% left internal carotid artery stenosis no significant stenosis of the right internal carotid artery system bilateral antegrade vertebral flow patent left carotid endarterectomy site  7.  History of left carotid endarterectomy  8.  Gallstones without cholecystitis, status postcholecystectomy  9.  GI bleed 2023  10.  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  Right common carotid artery stent left carotid endarterectomy      Objective   Wt Readings from Last 3 Encounters:   06/23/25 91.8 kg (202 lb 6.4 oz)   12/02/24 89.4 kg (197 lb)   09/09/24 91.2 kg (201 lb)        Vitals:    06/23/25 1028 06/23/25 1113 06/23/25 1114   BP: 176/60 162/74 180/72   BP Location: Left arm Left arm Left arm   Patient Position:  "Sitting Sitting Standing   Pulse: 54     Weight: 91.8 kg (202 lb 6.4 oz)     Height: 1.778 m (5' 10\")        Social history and family history reviewed.  Family history noncontributory given patient's age     Physical Exam:    GENERAL APPEARANCE: in no acute distress.  CHEST: Symmetric and non-tender.  INTEGUMENT: Skin warm and dry  HEENT: No gross abnormalities identified.No pallor or scleral icterus.  NECK: Supple, no JVD, no bruit.   NEURO/PSHCY: Alert and oriented x3; appropriate behavior and responses and responses  LUNGS: Clear to auscultation bilaterally; normal respiratory effort.  HEART: Rate and rhythm regular with no evident murmur; no gallop appreciated.   ABDOMEN: Soft, non tender.  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    Meds:  Current Outpatient Medications   Medication Instructions    acetaminophen (TYLENOL 8 HOUR) 650 mg, Every 8 hours PRN    amLODIPine-benazepriL (Lotrel) 10-20 mg capsule 1 capsule, oral, Daily    atorvastatin (LIPITOR) 80 mg, oral, Nightly    N3-T2-N3-B5-B6-iron-met-choln (Geritol Tonic with Ferrex 18) 2.5 mg-50 mg-18 iron/15 mL liquid Daily    CALCIUM POLYCARBOPHIL ORAL 1 tablet, Daily    carvedilol (COREG) 25 mg, oral, 2 times daily (morning and late afternoon)    multivitamin tablet 1 tablet, Daily    omeprazole (PRILOSEC) 20 mg, Daily before breakfast    pregabalin (LYRICA) 75 mg, 2 times daily    tamsulosin (Flomax) 0.4 mg 24 hr capsule 1 capsule, Daily    triamterene-hydrochlorothiazid (Maxzide-25mg) 37.5-25 mg tablet 1 tablet, oral, Daily        Allergies:  Patient has no known allergies.    April 2024-total cholesterol 162 HDL 43 triglycerides 200 LDL 89 total cholesterol to HDL ratio 3.8  Reviewed all available pertinent laboratory data and diagnostic testing results that occurred after the last office visit with me                Assessment:    1. Encounter for " pre-operative cardiovascular clearance  ECG 12 Lead    Nuclear Stress Test      2. Coronary artery disease involving native coronary artery of native heart without angina pectoris        3. History of PTCA        4. Pulmonary hypertension, unspecified (Multi)        5. Mixed hyperlipidemia  Lipid Panel    atorvastatin (Lipitor) 80 mg tablet    Lipid Panel      6. Essential hypertension        7. Use of cane as ambulatory aid        8. Abnormal electrocardiogram (ECG) (EKG)  Nuclear Stress Test      9. Uncontrolled hypertension  Nuclear Stress Test    Comprehensive Metabolic Panel    triamterene-hydrochlorothiazid (Maxzide-25mg) 37.5-25 mg tablet    amLODIPine-benazepriL (Lotrel) 10-20 mg capsule    carvedilol (Coreg) 25 mg tablet    Comprehensive Metabolic Panel      10. Pulmonary HTN (Multi)        11. Medication course changed  Comprehensive Metabolic Panel    Comprehensive Metabolic Panel      12. BMI 29.0-29.9,adult        13. Parkinson's disease, unspecified whether dyskinesia present, unspecified whether manifestations fluctuate        14. Former smoker        15. Arteriosclerotic coronary artery disease  Follow Up In Cardiology    Nuclear Stress Test           Clinical Decision Making:    Multiple comorbidities including hypertension that is suboptimally controlled, not orthostatic, atherosclerotic native vessel coronary artery disease per chart review, prior PCI, bilateral carotid disease, prior to proceeding with knee surgery, we will proceed with further testing given that his activity level is less than 4 METS.  Also has sinus bradycardia with first-degree AV block.    We will add Maxide 25 to his current regimen for blood pressure management.  Basic metabolic profile 7 to 10 days after initiation of Maxide, along with comprehensive profile and lipid profile if not already done by Dr. Kilgore.  Proceed with echocardiogram Lexiscan Myoview carotid ultrasound.    His blood pressure medications will be  titrated to upright blood pressure.    Follow up : after testing                IIwona LPN am scribing for, and in the presence of Dr. Johanna Vaughan MD, FACC.       I, Dr. Johanna Vaughan MD, FACC, personally performed the services described in the documentation as scribed by Iwona BELL LPN  in my presence, and confirm it is both accurate and complete.

## 2025-07-02 LAB
NON-UH HIE ALANINE AMINOTRANSFERASE: 13 U/L (ref 7–52)
NON-UH HIE ALBUMIN LEVEL: 3.6 G/DL (ref 3.5–5.7)
NON-UH HIE ALBUMIN/GLOBULIN RATIO: 1.4
NON-UH HIE ALKALINE PHOSPHATASE: 133 U/L (ref 34–104)
NON-UH HIE ANION GAP: 10.7 (ref 6–15)
NON-UH HIE ASPARTATE AMINO TRANSFERASE: 14 U/L (ref 13–39)
NON-UH HIE BILIRUBIN,TOTAL: 0.3 MG/DL (ref 0.3–1)
NON-UH HIE BLOOD UREA NITROGEN: 30 MG/DL (ref 7–25)
NON-UH HIE CALCIUM: 8.4 MG/DL (ref 8.6–10.3)
NON-UH HIE CARBON DIOXIDE: 25.9 MMOL/L (ref 21–31)
NON-UH HIE CHLORIDE: 107 MMOL/L (ref 98–107)
NON-UH HIE CHOL/HDL RATIO: 3.1
NON-UH HIE CHOLESTEROL: 124 MG/DL (ref 140–200)
NON-UH HIE CREATININE: 1.7 MG/DL (ref 0.7–1.3)
NON-UH HIE ESTIMATED GFR: 38.77
NON-UH HIE GLOBULIN: 2.6 G/DL
NON-UH HIE GLUCOSE: 92 MG/DL (ref 70–100)
NON-UH HIE HDL CHOLESTEROL: 40 MG/DL (ref 23–92)
NON-UH HIE LDL CHOLESTEROL,CALCULATED: 61 MG/DL (ref 0–100)
NON-UH HIE POTASSIUM: 4.6 MMOL/L (ref 3.5–5.1)
NON-UH HIE SODIUM: 139 MMOL/L (ref 136–145)
NON-UH HIE TOTAL PROTEIN: 6.2 G/DL (ref 6.4–8.9)
NON-UH HIE TRIGLYCERIDE W/REFLEX: 115 MG/DL (ref 0–149)
NON-UH HIE VLDL CHOLESTEROL: 23 MG/DL

## 2025-07-15 ENCOUNTER — APPOINTMENT (OUTPATIENT)
Dept: RADIOLOGY | Facility: CLINIC | Age: 85
End: 2025-07-15
Payer: MEDICARE

## 2025-07-15 ENCOUNTER — APPOINTMENT (OUTPATIENT)
Dept: CARDIOLOGY | Facility: CLINIC | Age: 85
End: 2025-07-15
Payer: MEDICARE

## 2025-07-17 ENCOUNTER — RESULTS FOLLOW-UP (OUTPATIENT)
Dept: CARDIOLOGY | Facility: CLINIC | Age: 85
End: 2025-07-17
Payer: MEDICARE

## 2025-07-17 NOTE — RESULT ENCOUNTER NOTE
Please keep your upcoming appointment to discuss lab results.  You have stage IIIb kidney disease, we will talk more, I do not have prior recent blood work to compare this with.  If you had blood work elsewhere please bring copy of the results with you.  Thank you so much

## 2025-07-18 NOTE — TELEPHONE ENCOUNTER
----- Message from Johanna Vaughan sent at 7/17/2025  6:25 PM EDT -----  Please keep your upcoming appointment to discuss lab results.  You have stage IIIb kidney disease, we will talk more, I do not have prior recent blood work to compare this with.  If you had blood   work elsewhere please bring copy of the results with you.  Thank you so much  ----- Message -----  From: Jessica Tariq - Lab Results In  Sent: 7/2/2025  10:08 AM EDT  To: Johanna Vaughan MD

## 2025-07-18 NOTE — TELEPHONE ENCOUNTER
Result Communication    Resulted Orders   [facility] Comprehensive Metabolic Panel   Result Value Ref Range    [facility] Glucose 92 70 - 100 mg/dL      Comment:         Random Glucose Reference Range is dependent on time and   content of last meal. Glucose of more than 200 mg/dL in a   nonstressed, ambulatory subject supports the diagnosis of   Diabetes Mellitus.   ADA recommended reference range    [facility] Blood Urea Nitrogen 30 (H) 7 - 25 mg/dL    [facility] Creatinine 1.70 (H) 0.70 - 1.30 mg/dL    [facility] ESTIMATED GFR 38.775     [facility] Sodium 139 136 - 145 mmol/L    [facility] Potassium 4.6 3.5 - 5.1 mmol/L    [facility] Chloride 107 98 - 107 mmol/L    [facility] Carbon Dioxide 25.9 21.0 - 31.0 mmol/L    [facility] Anion Gap 10.7 6.0 - 15.0    [facility] Calcium 8.4 (L) 8.6 - 10.3 mg/dL    [facility] Total Protein 6.2 (L) 6.4 - 8.9 g/dL    [facility] Albumin Level 3.6 3.5 - 5.7 g/dL    [facility] Globulin 2.6 g/dL    [facility] Albumin/Globulin Ratio 1.4     [facility] Bilirubin,Total 0.3 0.3 - 1.0 mg/dL    [facility] Aspartate Amino Transferase 14 13 - 39 U/L    [facility] Alanine Aminotransferase 13 7 - 52 U/L    [facility] Alkaline Phosphatase 133 (H) 34 - 104 U/L   [facility] Lipid Panel   Result Value Ref Range    [facility] Cholesterol 124 (L) 140 - 200 mg/dL      Comment:         Chol less than 200 mg/dl      low risk   Chol 201-239 mg/dl            borderline risk   Chol 240 mg/dl and greater    high risk    [facility] HDL Cholesterol 40 23 - 92 mg/dL      Comment:         HDL CHOL ATP-III CLASSIFICATION                              Cardiovascular                                  Risk      HDL > or equal to 60 mg/dL     LOW   HDL < 40 mg/dL                 HIGH    [facility] Triglyceride w/Reflex 115 0 - 149 mg/dL      Comment:         TRIG ATP III CLASSIFICATION   TRIG less than 150 mg/dL      Normal   TRIG 150-199 mg/dL            Borderline high   TRIG 200-500 mg/dL            High   TRIG  greater than 500 mg/dL   Very high   Standard traceable to the Center for Disease Conrtrol and   Prevention (CDC) test method.    [facility] LDL Cholesterol,Calculated 61 0 - 100 mg/dL      Comment:         LDL ATP III CLASSIFICATION   LDL less than 100 mg/dL       Optimal   -129 mg/dL             Near or above optimal   -159 mg/dL             Borderline high   -189 mg/dL             High   LDL greater than 189 mg/dL    Very high    [facility] VLDL CHOLESTEROL 23 mg/dL    [facility] Chol/HDL Ratio 3.1 <5.0      Comment:      PERFORMED BY:[facility] [place], [place] [id number] MEDICAL DIRECTORSUZANNE MARQUEZ M.D.       2:16 PM      Results were successfully communicated with the patient and they acknowledged their understanding.

## 2025-07-18 NOTE — TELEPHONE ENCOUNTER
Patient advised, verbalized understanding. Patient reports he does not see a kidney doctor at this time and his last set of labs was drawn 04/2025, they are available for viewing under care everywhere.  POV 08/01/2025

## 2025-07-22 ENCOUNTER — APPOINTMENT (OUTPATIENT)
Dept: CARDIOLOGY | Facility: CLINIC | Age: 85
End: 2025-07-22
Payer: MEDICARE

## 2025-07-22 ENCOUNTER — HOSPITAL ENCOUNTER (OUTPATIENT)
Dept: CARDIOLOGY | Facility: CLINIC | Age: 85
End: 2025-07-22
Payer: MEDICARE

## 2025-07-22 ENCOUNTER — APPOINTMENT (OUTPATIENT)
Dept: RADIOLOGY | Facility: CLINIC | Age: 85
End: 2025-07-22
Payer: MEDICARE

## 2025-07-22 ENCOUNTER — HOSPITAL ENCOUNTER (OUTPATIENT)
Dept: RADIOLOGY | Facility: CLINIC | Age: 85
Discharge: HOME | End: 2025-07-22
Payer: MEDICARE

## 2025-07-22 ENCOUNTER — HOSPITAL ENCOUNTER (OUTPATIENT)
Dept: RADIOLOGY | Facility: CLINIC | Age: 85
End: 2025-07-22
Payer: MEDICARE

## 2025-07-22 DIAGNOSIS — R94.31 ABNORMAL ELECTROCARDIOGRAM (ECG) (EKG): ICD-10-CM

## 2025-07-22 DIAGNOSIS — I25.10 ARTERIOSCLEROTIC CORONARY ARTERY DISEASE: ICD-10-CM

## 2025-07-22 DIAGNOSIS — Z01.810 ENCOUNTER FOR PRE-OPERATIVE CARDIOVASCULAR CLEARANCE: ICD-10-CM

## 2025-07-22 DIAGNOSIS — I10 UNCONTROLLED HYPERTENSION: ICD-10-CM

## 2025-07-23 ENCOUNTER — HOSPITAL ENCOUNTER (OUTPATIENT)
Dept: RADIOLOGY | Facility: CLINIC | Age: 85
Discharge: HOME | End: 2025-07-23
Payer: MEDICARE

## 2025-07-23 ENCOUNTER — HOSPITAL ENCOUNTER (OUTPATIENT)
Dept: CARDIOLOGY | Facility: CLINIC | Age: 85
Discharge: HOME | End: 2025-07-23
Payer: MEDICARE

## 2025-07-23 VITALS — SYSTOLIC BLOOD PRESSURE: 136 MMHG | HEART RATE: 56 BPM | DIASTOLIC BLOOD PRESSURE: 76 MMHG

## 2025-07-23 PROCEDURE — A9502 TC99M TETROFOSMIN: HCPCS | Performed by: INTERNAL MEDICINE

## 2025-07-23 PROCEDURE — 3430000001 HC RX 343 DIAGNOSTIC RADIOPHARMACEUTICALS: Performed by: INTERNAL MEDICINE

## 2025-07-23 PROCEDURE — 78452 HT MUSCLE IMAGE SPECT MULT: CPT

## 2025-07-23 PROCEDURE — 78452 HT MUSCLE IMAGE SPECT MULT: CPT | Performed by: INTERNAL MEDICINE

## 2025-07-23 PROCEDURE — 2500000004 HC RX 250 GENERAL PHARMACY W/ HCPCS (ALT 636 FOR OP/ED): Performed by: INTERNAL MEDICINE

## 2025-07-23 PROCEDURE — 93016 CV STRESS TEST SUPVJ ONLY: CPT | Performed by: INTERNAL MEDICINE

## 2025-07-23 PROCEDURE — 93017 CV STRESS TEST TRACING ONLY: CPT

## 2025-07-23 PROCEDURE — 93018 CV STRESS TEST I&R ONLY: CPT | Performed by: INTERNAL MEDICINE

## 2025-07-23 RX ORDER — REGADENOSON 0.08 MG/ML
0.4 INJECTION, SOLUTION INTRAVENOUS ONCE
Status: COMPLETED | OUTPATIENT
Start: 2025-07-23 | End: 2025-07-23

## 2025-07-23 RX ADMIN — REGADENOSON 0.4 MG: 0.08 INJECTION, SOLUTION INTRAVENOUS at 14:28

## 2025-07-23 RX ADMIN — TETROFOSMIN 32.3 MILLICURIE: 0.23 INJECTION, POWDER, LYOPHILIZED, FOR SOLUTION INTRAVENOUS at 14:29

## 2025-07-23 RX ADMIN — TETROFOSMIN 10.7 MILLICURIE: 0.23 INJECTION, POWDER, LYOPHILIZED, FOR SOLUTION INTRAVENOUS at 13:13

## 2025-08-01 ENCOUNTER — APPOINTMENT (OUTPATIENT)
Dept: CARDIOLOGY | Facility: CLINIC | Age: 85
End: 2025-08-01
Payer: MEDICARE

## 2025-08-01 VITALS
WEIGHT: 196.4 LBS | HEART RATE: 58 BPM | SYSTOLIC BLOOD PRESSURE: 136 MMHG | DIASTOLIC BLOOD PRESSURE: 60 MMHG | HEIGHT: 70 IN | BODY MASS INDEX: 28.12 KG/M2

## 2025-08-01 DIAGNOSIS — Z71.2 ENCOUNTER TO DISCUSS TEST RESULTS: ICD-10-CM

## 2025-08-01 DIAGNOSIS — E78.2 MIXED HYPERLIPIDEMIA: ICD-10-CM

## 2025-08-01 DIAGNOSIS — I25.10 CORONARY ARTERY DISEASE INVOLVING NATIVE CORONARY ARTERY OF NATIVE HEART WITHOUT ANGINA PECTORIS: ICD-10-CM

## 2025-08-01 DIAGNOSIS — R94.31 ABNORMAL ELECTROCARDIOGRAM (ECG) (EKG): ICD-10-CM

## 2025-08-01 DIAGNOSIS — Z99.89 USE OF CANE AS AMBULATORY AID: ICD-10-CM

## 2025-08-01 DIAGNOSIS — N18.32 CHRONIC KIDNEY DISEASE, STAGE 3B (MULTI): ICD-10-CM

## 2025-08-01 DIAGNOSIS — R94.39 ABNORMAL FINDING ON CARDIOVASCULAR STRESS TEST: ICD-10-CM

## 2025-08-01 DIAGNOSIS — Z87.891 FORMER SMOKER: ICD-10-CM

## 2025-08-01 DIAGNOSIS — I25.10 ARTERIOSCLEROTIC CORONARY ARTERY DISEASE: ICD-10-CM

## 2025-08-01 DIAGNOSIS — Z01.810 PREOP CARDIOVASCULAR EXAM: Primary | ICD-10-CM

## 2025-08-01 DIAGNOSIS — D64.9 ANEMIA, UNSPECIFIED TYPE: ICD-10-CM

## 2025-08-01 PROBLEM — I50.43 ACUTE ON CHRONIC COMBINED SYSTOLIC AND DIASTOLIC CONGESTIVE HEART FAILURE: Status: ACTIVE | Noted: 2025-08-01

## 2025-08-01 PROBLEM — I25.5 ISCHEMIC CARDIOMYOPATHY: Status: ACTIVE | Noted: 2025-08-01

## 2025-08-01 PROBLEM — I50.31 ACUTE DIASTOLIC HEART FAILURE: Status: RESOLVED | Noted: 2025-08-01 | Resolved: 2025-08-01

## 2025-08-01 PROBLEM — I50.21 ACUTE SYSTOLIC HEART FAILURE: Status: RESOLVED | Noted: 2025-08-01 | Resolved: 2025-08-01

## 2025-08-01 PROBLEM — I50.31 ACUTE DIASTOLIC HEART FAILURE: Status: ACTIVE | Noted: 2025-08-01

## 2025-08-01 PROBLEM — I50.21 ACUTE SYSTOLIC HEART FAILURE: Status: ACTIVE | Noted: 2025-08-01

## 2025-08-01 PROCEDURE — G2211 COMPLEX E/M VISIT ADD ON: HCPCS | Performed by: INTERNAL MEDICINE

## 2025-08-01 PROCEDURE — 1160F RVW MEDS BY RX/DR IN RCRD: CPT | Performed by: INTERNAL MEDICINE

## 2025-08-01 PROCEDURE — 3078F DIAST BP <80 MM HG: CPT | Performed by: INTERNAL MEDICINE

## 2025-08-01 PROCEDURE — 99214 OFFICE O/P EST MOD 30 MIN: CPT | Performed by: INTERNAL MEDICINE

## 2025-08-01 PROCEDURE — 3075F SYST BP GE 130 - 139MM HG: CPT | Performed by: INTERNAL MEDICINE

## 2025-08-01 PROCEDURE — 1159F MED LIST DOCD IN RCRD: CPT | Performed by: INTERNAL MEDICINE

## 2025-08-01 RX ORDER — NAPROXEN SODIUM 220 MG/1
81 TABLET, FILM COATED ORAL DAILY
Start: 2025-08-01 | End: 2026-08-01

## 2025-08-01 NOTE — PROGRESS NOTES
Chief Complaint:    Chief Complaint   Patient presents with    Follow-up     Stress test results   Patient with multiple comorbidities including suboptimal blood pressure control, atherosclerotic native vessel coronary artery disease with prior PCI, bilateral carotid disease, was seen for preoperative cardiac risk assessment for knee surgery, and perfusion imaging study was ordered.  Sulovp71 mg daily was added.  Echocardiogram was also ordered along with blood work.  He presents for follow-up.    Subjective :     Review of Systems voices no major complaints.  Accompanied by wife to the office.  Uses cane as ambulatory aid.  Denies chest pressure tightness heaviness lightheadedness or falls.  No lower extremity edema.    History so Far :    1.  Primary hypertension  2.  Mixed hyperlipidemia  3.  Hard of hearing  4.  Abnormal EKG with sinus bradycardia and first-degree AV block June 2025  5.  Echocardiogram October 2024-LVEF 55 to 60% normal chamber dimensions normal RV systolic function mild concentric left ventricular hypertrophy mild mitral regurgitation moderately dilated IVC left atrium 4.4 cm in diameter RV systolic pressure 46 mmHg.  Extent of tricuspid regurgitation was not reported.  6.  Carotid ultrasound October 2024-less than 50% left internal carotid artery stenosis no significant stenosis of the right internal carotid artery system bilateral antegrade vertebral flow patent left carotid endarterectomy site  7.  History of left carotid endarterectomy  8.  Gallstones without cholecystitis, status postcholecystectomy  9.  GI bleed 2023  10.  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  Right common carotid artery stent left carotid endarterectomy  12.    Objective   Wt Readings from Last 3 Encounters:   08/01/25 89.1 kg (196 lb 6.4 oz)   06/23/25 91.8 kg (202 lb 6.4 oz)   12/02/24 89.4 kg (197 lb)  "       Vitals:    08/01/25 1427   BP: 136/60   BP Location: Left arm   Patient Position: Sitting   Pulse: 58   Weight: 89.1 kg (196 lb 6.4 oz)   Height: 1.778 m (5' 10\")           Physical Exam:    GENERAL APPEARANCE: in no acute distress.  CHEST: Symmetric and non-tender.  INTEGUMENT: Skin warm and dry  HEENT: No gross abnormalities identified.No pallor or scleral icterus.  NECK: Supple, no JVD, no bruit.   NEURO/PSHCY: Alert and oriented x3; appropriate behavior and responses and responses  LUNGS: Clear to auscultation bilaterally; normal respiratory effort.  HEART: Rate and rhythm regular with no evident murmur; no gallop appreciated.   ABDOMEN: Soft, non tender.  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  EXTREMITIES: Warm  There is no edema noted.    Meds:  Current Outpatient Medications   Medication Instructions    acetaminophen (TYLENOL 8 HOUR) 650 mg, Every 8 hours PRN    amLODIPine-benazepriL (Lotrel) 10-20 mg capsule 1 capsule, oral, Daily    aspirin 81 mg, oral, Daily    atorvastatin (LIPITOR) 80 mg, oral, Nightly    W9-B4-D5-B5-B6-iron-met-choln (Geritol Tonic with Ferrex 18) 2.5 mg-50 mg-18 iron/15 mL liquid Daily    CALCIUM POLYCARBOPHIL ORAL 1 tablet, Daily    carvedilol (COREG) 25 mg, oral, 2 times daily (morning and late afternoon)    multivitamin tablet 1 tablet, Daily    omeprazole (PRILOSEC) 20 mg, Daily before breakfast    pregabalin (LYRICA) 75 mg, 2 times daily    tamsulosin (Flomax) 0.4 mg 24 hr capsule 1 capsule, Daily    triamterene-hydrochlorothiazid (Maxzide-25mg) 37.5-25 mg tablet 1 tablet, oral, Daily        Allergies:  Patient has no known allergies.      LABS: Sodium 140 potassium 5 BUN 37 creatinine 1.74 GFR 37.7 July 2025 in January 2025 his GFR was 59  Hemoglobin and hematocrit in August 2024 was 9.3 and 27.9 respectively.  7/29/2025 hemoglobin 10.4 hematocrit 31  Testing Reviewed      Reviewed all available pertinent laboratory data and " diagnostic testing results that occurred after the last office visit with me                Assessment:    1. Preop cardiovascular exam        2. Arteriosclerotic coronary artery disease  Follow Up In Cardiology    Transthoracic Echo Complete    Follow Up In Cardiology    Cardiac Catheterization - Onbase Scan    aspirin 81 mg chewable tablet      3. Encounter to discuss test results        4. Mixed hyperlipidemia  Cardiac Catheterization - Onbase Scan      5. Abnormal electrocardiogram (ECG) (EKG)  Transthoracic Echo Complete    Cardiac Catheterization - Onbase Scan      6. Abnormal finding on cardiovascular stress test  Transthoracic Echo Complete    Cardiac Catheterization - Onbase Scan      7. Anemia, unspecified type        8. Chronic kidney disease, stage 3b (Multi)        9. Use of cane as ambulatory aid        10. Former smoker        11. Body mass index (BMI) of 28.0 to 28.9 in adult        12. Coronary artery disease involving native coronary artery of native heart without angina pectoris             Clinical Decision Making:    Test results were reviewed.  This is a patient with activity level less than 4 METS, with abnormal perfusion study, abnormal left ventricular ejection fraction and remote PCI and stenting, remote myocardial infarction the details of which are not available to us.  He is not in decompensated heart failure, has no critical valvular disease, and is not having any angina pectoris.  However given the perfusion study results and his low level of activity,  Patient is recommended to have left heart catheterization possible intervention, prior to knee surgery.  Procedure risk benefits and alternatives were reviewed.  Wife was present.  Risk discussed included but were not limited to MI, stroke, death, peripheral vascular compromise and allergic reaction to dye.  He also has CKD 3B, we will recheck labs, and recommend hydration prior to procedure.  Patient and wife are requesting procedure  to be done locally, as they have no way of transportation to go to [facility].    Will initiate enteric-coated aspirin 81 mg p.o. daily  Would avoid intervention unless absolutely critical disease is identified.  Echocardiogram is reordered, has not been done    Patient goes on to say that he is scheduled to have IV iron infusions.  He has anemia, but his recent numbers are actually better than prior numbers.  No bleeding diathesis identified.  He does take iron supplementation.    Once again, the primary goal of the cardiac catheterization is to rule out critical coronary artery disease that may interfere with his ability to go through surgery in a safe manner.     Patient will be asked to report early for IV hydration prior to left heart catheterization.  Follow up : after testing                Lisa TIM LPN am scribing for, and in the presence of Dr. Johanna Vaughan MD, FACC.       I, Dr. Johanna Vaughan MD, FACC, personally performed the services described in the documentation as scribed by Lisa LEIVA LPN in my presence, and confirm it is both accurate and complete.

## 2025-08-01 NOTE — LETTER
August 1, 2025     Hipolito Wilde DO  [address]  [place] [address]    Patient: Wilfred Kearns   YOB: 1940   Date of Visit: 8/1/2025       Dear Dr. Hipolito Wilde DO:    Thank you for referring Wilfred Kearns to me for evaluation. Below are my notes for this consultation.  If you have questions, please do not hesitate to call me. I look forward to following your patient along with you.       Sincerely,     Johanna Vaughan MD      CC: No Recipients  ______________________________________________________________________________________        Chief Complaint:    Chief Complaint   Patient presents with   • Follow-up     Stress test results   Patient with multiple comorbidities including suboptimal blood pressure control, atherosclerotic native vessel coronary artery disease with prior PCI, bilateral carotid disease, was seen for preoperative cardiac risk assessment for knee surgery, and perfusion imaging study was ordered.  Ncrfde30 mg daily was added.  Echocardiogram was also ordered along with blood work.  He presents for follow-up.    Subjective :     Review of Systems voices no major complaints.  Accompanied by wife to the office.  Uses cane as ambulatory aid.  Denies chest pressure tightness heaviness lightheadedness or falls.  No lower extremity edema.    History so Far :    1.  Primary hypertension  2.  Mixed hyperlipidemia  3.  Hard of hearing  4.  Abnormal EKG with sinus bradycardia and first-degree AV block June 2025  5.  Echocardiogram October 2024-LVEF 55 to 60% normal chamber dimensions normal RV systolic function mild concentric left ventricular hypertrophy mild mitral regurgitation moderately dilated IVC left atrium 4.4 cm in diameter RV systolic pressure 46 mmHg.  Extent of tricuspid regurgitation was not reported.  6.  Carotid ultrasound October 2024-less than 50% left internal carotid artery stenosis no significant stenosis of the right internal carotid artery system  "bilateral antegrade vertebral flow patent left carotid endarterectomy site  7.  History of left carotid endarterectomy  8.  Gallstones without cholecystitis, status postcholecystectomy  9.  GI bleed 2023  10.  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  Right common carotid artery stent left carotid endarterectomy  12.    Objective   Wt Readings from Last 3 Encounters:   08/01/25 89.1 kg (196 lb 6.4 oz)   06/23/25 91.8 kg (202 lb 6.4 oz)   12/02/24 89.4 kg (197 lb)        Vitals:    08/01/25 1427   BP: 136/60   BP Location: Left arm   Patient Position: Sitting   Pulse: 58   Weight: 89.1 kg (196 lb 6.4 oz)   Height: 1.778 m (5' 10\")           Physical Exam:    GENERAL APPEARANCE: in no acute distress.  CHEST: Symmetric and non-tender.  INTEGUMENT: Skin warm and dry  HEENT: No gross abnormalities identified.No pallor or scleral icterus.  NECK: Supple, no JVD, no bruit.   NEURO/PSHCY: Alert and oriented x3; appropriate behavior and responses and responses  LUNGS: Clear to auscultation bilaterally; normal respiratory effort.  HEART: Rate and rhythm regular with no evident murmur; no gallop appreciated.   ABDOMEN: Soft, non tender.  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  EXTREMITIES: Warm  There is no edema noted.    Meds:  Current Outpatient Medications   Medication Instructions   • acetaminophen (TYLENOL 8 HOUR) 650 mg, Every 8 hours PRN   • amLODIPine-benazepriL (Lotrel) 10-20 mg capsule 1 capsule, oral, Daily   • aspirin 81 mg, oral, Daily   • atorvastatin (LIPITOR) 80 mg, oral, Nightly   • O5-H8-A3-B5-B6-iron-met-choln (Geritol Tonic with Ferrex 18) 2.5 mg-50 mg-18 iron/15 mL liquid Daily   • CALCIUM POLYCARBOPHIL ORAL 1 tablet, Daily   • carvedilol (COREG) 25 mg, oral, 2 times daily (morning and late afternoon)   • multivitamin tablet 1 tablet, Daily   • omeprazole " (PRILOSEC) 20 mg, Daily before breakfast   • pregabalin (LYRICA) 75 mg, 2 times daily   • tamsulosin (Flomax) 0.4 mg 24 hr capsule 1 capsule, Daily   • triamterene-hydrochlorothiazid (Maxzide-25mg) 37.5-25 mg tablet 1 tablet, oral, Daily        Allergies:  Patient has no known allergies.      LABS: Sodium 140 potassium 5 BUN 37 creatinine 1.74 GFR 37.7 July 2025 in January 2025 his GFR was 59  Hemoglobin and hematocrit in August 2024 was 9.3 and 27.9 respectively.  7/29/2025 hemoglobin 10.4 hematocrit 31  Testing Reviewed      Reviewed all available pertinent laboratory data and diagnostic testing results that occurred after the last office visit with me                Assessment:    1. Preop cardiovascular exam        2. Arteriosclerotic coronary artery disease  Follow Up In Cardiology    Transthoracic Echo Complete    Follow Up In Cardiology    Cardiac Catheterization - Onbase Scan    aspirin 81 mg chewable tablet      3. Encounter to discuss test results        4. Mixed hyperlipidemia  Cardiac Catheterization - Onbase Scan      5. Abnormal electrocardiogram (ECG) (EKG)  Transthoracic Echo Complete    Cardiac Catheterization - Onbase Scan      6. Abnormal finding on cardiovascular stress test  Transthoracic Echo Complete    Cardiac Catheterization - Onbase Scan      7. Anemia, unspecified type        8. Chronic kidney disease, stage 3b (Multi)        9. Use of cane as ambulatory aid        10. Former smoker        11. Body mass index (BMI) of 28.0 to 28.9 in adult        12. Coronary artery disease involving native coronary artery of native heart without angina pectoris             Clinical Decision Making:    Test results were reviewed.  This is a patient with activity level less than 4 METS, with abnormal perfusion study, abnormal left ventricular ejection fraction and remote PCI and stenting, remote myocardial infarction the details of which are not available to us.  He is not in decompensated heart failure,  has no critical valvular disease, and is not having any angina pectoris.  However given the perfusion study results and his low level of activity,  Patient is recommended to have left heart catheterization possible intervention, prior to knee surgery.  Procedure risk benefits and alternatives were reviewed.  Wife was present.  Risk discussed included but were not limited to MI, stroke, death, peripheral vascular compromise and allergic reaction to dye.  He also has CKD 3B, we will recheck labs, and recommend hydration prior to procedure.  Patient and wife are requesting procedure to be done locally, as they have no way of transportation to go to [place].    Will initiate enteric-coated aspirin 81 mg p.o. daily  Would avoid intervention unless absolutely critical disease is identified.  Echocardiogram is reordered, has not been done    Patient goes on to say that he is scheduled to have IV iron infusions.  He has anemia, but his recent numbers are actually better than prior numbers.  No bleeding diathesis identified.  He does take iron supplementation.    Once again, the primary goal of the cardiac catheterization is to rule out critical coronary artery disease that may interfere with his ability to go through surgery in a safe manner.     Patient will be asked to report early for IV hydration prior to left heart catheterization.  Follow up : after testing                Lisa TIM LPN am scribing for, and in the presence of Dr. Johanna Vaughan MD, FACC.       I, Dr. Johanna Vaughan MD, FACC, personally performed the services described in the documentation as scribed by Lisa LEIVA LPN in my presence, and confirm it is both accurate and complete.

## 2025-08-01 NOTE — PATIENT INSTRUCTIONS
Please bring all medicines, vitamins, and herbal supplements with you when you come to the office.    Prescriptions will not be filled unless you are compliant with your follow up appointments or have a follow up appointment scheduled as per instruction of your physician. Refills should be requested at the time of your visit.     BMI was above normal measurement. Current weight: 89.1 kg (196 lb 6.4 oz)  Weight change since last visit (-) denotes wt loss -6 lbs   Weight loss needed to achieve BMI 25: 22.5 Lbs  Weight loss needed to achieve BMI 30: -12.2 Lbs  Provided instructions on dietary changes  Provided instructions on exercise.    
132.3

## 2025-08-04 ENCOUNTER — TELEPHONE (OUTPATIENT)
Dept: CARDIOLOGY | Facility: CLINIC | Age: 85
End: 2025-08-04
Payer: MEDICARE

## 2025-08-04 NOTE — TELEPHONE ENCOUNTER
----- Message from Johanna Vaughan sent at 8/1/2025  5:01 PM EDT -----  When this patient is scheduled for cardiac catheterization, please make sure he arrives early for hydration, normal saline 150 cc an hour for 4 hours prior to procedure, thank you please call Cath Lab or whoever we need to notify

## 2025-08-04 NOTE — TELEPHONE ENCOUNTER
Patient is scheduled for pre-surgical testing Tuesday 08.05.25 at @ 8:30 am. He is to fast tonight after midnight.  Water is ok.     Catheter is scheduled for Thursday August 7th at 2:00pm. He may have a light breakfast before 8:00 am, then he is to fast after 8:00 am that morning.  Patient must arrive by 10:00 am, as he is to have 4 hours if IV hydration prior to the cath.      Patient will need to stay 4-6 hours after the cath.      Patient is scheduled for ECHO 09.03.25 and follow up appt with Dr. Johanna Vaughan MD 09.08.28, and is to keep these appt's as scheduled.     Daughter Yina [phone]

## 2025-08-04 NOTE — TELEPHONE ENCOUNTER
Called daughter, Yina, to discuss details.  No answer, no voicemail set up.     TO [facility] for follow up.    [NL] : soft, non tender, non distended, normal bowel sounds, no hepatosplenomegaly [de-identified] : hyperpigmented oval/irregular shaped macule on right lateral thigh

## 2025-08-04 NOTE — TELEPHONE ENCOUNTER
Spoke with daughter, Yina, discussed all details at length.  She was able to repeat instructions correctly.     Cath lab was informed of hydration instructions per Rocio Jackson RN.

## 2025-08-05 ENCOUNTER — TELEPHONE (OUTPATIENT)
Dept: CARDIOLOGY | Facility: CLINIC | Age: 85
End: 2025-08-05
Payer: MEDICARE

## 2025-08-05 NOTE — TELEPHONE ENCOUNTER
Heart Cath [id number] DX: I25.10, E78.2, R94.31, R94.39 at [facility] does not require a prior auth as the patient has Medicare primary.

## 2025-08-06 ENCOUNTER — TELEPHONE (OUTPATIENT)
Dept: CARDIOLOGY | Facility: CLINIC | Age: 85
End: 2025-08-06
Payer: MEDICARE

## 2025-08-06 LAB
NON-UH HIE ANION GAP: 9.7 (ref 6–15)
NON-UH HIE BASOPHILS # (AUTO): 0 10*3/UL (ref 0–0.2)
NON-UH HIE BASOPHILS % (AUTO): 0.3 %
NON-UH HIE BLOOD UREA NITROGEN: 50 MG/DL (ref 7–25)
NON-UH HIE CARBON DIOXIDE: 25.8 MMOL/L (ref 21–31)
NON-UH HIE CHLORIDE: 106 MMOL/L (ref 98–107)
NON-UH HIE CHOL/HDL RATIO: 3.4
NON-UH HIE CHOLESTEROL: 126 MG/DL (ref 140–200)
NON-UH HIE CREATININE: 2.12 MG/DL (ref 0.7–1.3)
NON-UH HIE EOSINOPHILS # (AUTO): 0.1 10*3/UL (ref 0–0.45)
NON-UH HIE EOSINOPHILS % (AUTO): 1.4 %
NON-UH HIE ESTIMATED GFR: 29.57
NON-UH HIE HDL CHOLESTEROL: 37 MG/DL (ref 23–92)
NON-UH HIE HEMATOCRIT: 32.7 % (ref 38.8–50)
NON-UH HIE HEMOGLOBIN: 10.6 G/DL (ref 13–17)
NON-UH HIE INR: 1.1
NON-UH HIE LDL CHOLESTEROL,CALCULATED: 68 MG/DL (ref 0–100)
NON-UH HIE LYMPHOCYTES # (AUTO): 1.7 10*3/UL (ref 1–4.8)
NON-UH HIE LYMPHOCYTES % (AUTO): 31 %
NON-UH HIE MEAN CORPUSCULAR HEMOGLOBIN: 29.4 PG (ref 27.5–35.2)
NON-UH HIE MEAN CORPUSCULAR HGB CONC: 32.4 G/DL (ref 32.5–35.6)
NON-UH HIE MEAN CORPUSCULAR VOLUME: 90.7 FL (ref 83.5–101)
NON-UH HIE MEAN PLATELET VOLUME: 6.5 FL (ref 6.6–10.1)
NON-UH HIE MONOCYTES # (AUTO): 0.4 10*3/UL (ref 0–0.8)
NON-UH HIE MONOCYTES % (AUTO): 7.3 %
NON-UH HIE NEUTROPHILS # (AUTO): 3.2 10*3/UL (ref 1.8–7.7)
NON-UH HIE NEUTROPHILS % (AUTO): 60 %
NON-UH HIE NRBC%: 0 /100{WBC} (ref 0–0.5)
NON-UH HIE PARTIAL THROMBOPLASTIN TIME: 37.5 S (ref 25.1–36.5)
NON-UH HIE PLATELET COUNT: 310 10*3/UL (ref 150–450)
NON-UH HIE POTASSIUM: 4.5 MMOL/L (ref 3.5–5.1)
NON-UH HIE PROTHROMBIN TIME: 12.2 S (ref 9–12.9)
NON-UH HIE RED BLOOD COUNT: 3.61 10*6/UL (ref 3.9–5.6)
NON-UH HIE RED CELL DISTRIBUTION WIDTH: 15.4 % (ref 12–14.8)
NON-UH HIE SODIUM: 137 MMOL/L (ref 136–145)
NON-UH HIE TRIGLYCERIDE W/REFLEX: 107 MG/DL (ref 0–149)
NON-UH HIE UNCORRECTED WBC: 5.4 10*3/UL (ref 4.1–10.5)
NON-UH HIE VLDL CHOLESTEROL: 21 MG/DL
NON-UH HIE WHITE BLOOD COUNT: 5.4 [CFU]/ML (ref 4.1–10.5)

## 2025-08-06 NOTE — TELEPHONE ENCOUNTER
[facility] [place] that patient's creatine came back at 2.12. Last creatine in July was 1.74. Patient is schedule to come four hours early for hydration. Okay to proceed with cath? Please advise.    To Dr. Johanna Vaughan MD and Dr. Cristobal Bhakta, DO since he is doing the cath.

## 2025-08-08 LAB — NON-UH HIE TROPONIN I HIGH SENSITIVITY: 132 (ref 0–20)

## 2025-09-08 ENCOUNTER — APPOINTMENT (OUTPATIENT)
Dept: CARDIOLOGY | Facility: CLINIC | Age: 85
End: 2025-09-08
Payer: MEDICARE

## 2025-11-07 ENCOUNTER — APPOINTMENT (OUTPATIENT)
Dept: CARDIOLOGY | Facility: CLINIC | Age: 85
End: 2025-11-07
Payer: MEDICARE